# Patient Record
Sex: MALE | Race: WHITE | Employment: FULL TIME | URBAN - METROPOLITAN AREA
[De-identification: names, ages, dates, MRNs, and addresses within clinical notes are randomized per-mention and may not be internally consistent; named-entity substitution may affect disease eponyms.]

---

## 2022-11-16 ENCOUNTER — OFFICE VISIT (OUTPATIENT)
Dept: OCCUPATIONAL MEDICINE | Age: 62
End: 2022-11-16

## 2022-11-16 DIAGNOSIS — J01.10 ACUTE FRONTAL SINUSITIS, RECURRENCE NOT SPECIFIED: Primary | ICD-10-CM

## 2022-11-16 RX ORDER — AMOXICILLIN AND CLAVULANATE POTASSIUM 875; 125 MG/1; MG/1
1 TABLET, FILM COATED ORAL 2 TIMES DAILY
Qty: 14 TABLET | Refills: 0 | Status: SHIPPED | OUTPATIENT
Start: 2022-11-16 | End: 2022-11-23

## 2022-11-27 ASSESSMENT — ENCOUNTER SYMPTOMS
NAUSEA: 0
SORE THROAT: 1
COUGH: 0
SHORTNESS OF BREATH: 0
VOMITING: 0

## 2022-11-27 NOTE — PROGRESS NOTES
PROGRESS NOTE    SUBJECTIVE:   Duong Valdez is a 63 y.o. male seen for a follow up visit regarding Sinusitis     The patient is a 63 y.o. male who presents with complaint of congestion, facial pain, nasal congestion, purulent nasal discharge, sinus pressure, sore throat, and tooth pain with no fever, chills, night sweats or weight loss. Onset of symptoms was several days ago, gradually worsening since that time. Treatment to date: oral decongestant, nasal decongestant spray, antihistamines, Acetaminophen, NSAID. Past Medical History, Past Surgical History, Family history, Social History, and Medications were all reviewed with the patient today and updated as necessary. No current outpatient medications on file. No current facility-administered medications for this visit. No Known Allergies  There is no problem list on file for this patient. No past medical history on file. No past surgical history on file. Family History   Problem Relation Age of Onset    Diabetes Mother     Cancer Mother     Diabetes Father     Cancer Father      Social History     Tobacco Use    Smoking status: Every Day    Smokeless tobacco: Never   Substance Use Topics    Alcohol use: Not on file       Review of Systems   Constitutional:  Negative for chills and fatigue. HENT:  Positive for congestion, ear pain and sore throat. Respiratory:  Negative for cough and shortness of breath. Gastrointestinal:  Negative for nausea and vomiting. Musculoskeletal:  Negative for myalgias. Skin:  Negative for rash. Neurological:  Negative for headaches. OBJECTIVE:  There were no vitals taken for this visit. Physical Exam  Constitutional:       Appearance: Normal appearance. HENT:      Head: Normocephalic. Right Ear: Hearing and external ear normal. Swelling and tenderness present. A middle ear effusion is present. Left Ear: Hearing and external ear normal. Swelling and tenderness present.  A middle ear effusion is present. Nose: Mucosal edema, congestion and rhinorrhea present. Right Turbinates: Swollen and pale. Left Turbinates: Swollen and pale. Right Sinus: Maxillary sinus tenderness and frontal sinus tenderness present. Left Sinus: Maxillary sinus tenderness and frontal sinus tenderness present. Cardiovascular:      Rate and Rhythm: Normal rate and regular rhythm. Pulmonary:      Effort: Pulmonary effort is normal.      Breath sounds: Normal breath sounds. Neurological:      Mental Status: He is alert. ASSESSMENT and PLAN    Kori Henning was seen today for sinusitis. Diagnoses and all orders for this visit:    Acute frontal sinusitis, recurrence not specified    Other orders  -     amoxicillin-clavulanate (AUGMENTIN) 875-125 MG per tablet; Take 1 tablet by mouth 2 times daily for 7 days        Salinerinses, flonase zyrtec, mucinex, take abx. Reviewed triggesr, prevention and hygiene.